# Patient Record
Sex: FEMALE | Race: WHITE
[De-identification: names, ages, dates, MRNs, and addresses within clinical notes are randomized per-mention and may not be internally consistent; named-entity substitution may affect disease eponyms.]

---

## 2018-12-19 ENCOUNTER — HOSPITAL ENCOUNTER (EMERGENCY)
Dept: HOSPITAL 74 - JER | Age: 43
Discharge: HOME | End: 2018-12-19
Payer: COMMERCIAL

## 2018-12-19 VITALS — HEART RATE: 120 BPM | SYSTOLIC BLOOD PRESSURE: 103 MMHG | DIASTOLIC BLOOD PRESSURE: 72 MMHG

## 2018-12-19 VITALS — TEMPERATURE: 97.3 F

## 2018-12-19 VITALS — BODY MASS INDEX: 29.1 KG/M2

## 2018-12-19 DIAGNOSIS — J06.9: Primary | ICD-10-CM

## 2018-12-19 LAB
ANION GAP SERPL CALC-SCNC: 6 MMOL/L (ref 8–16)
BASOPHILS # BLD: 0.4 % (ref 0–2)
BUN SERPL-MCNC: 8 MG/DL (ref 7–18)
CALCIUM SERPL-MCNC: 8.6 MG/DL (ref 8.5–10.1)
CHLORIDE SERPL-SCNC: 106 MMOL/L (ref 98–107)
CO2 SERPL-SCNC: 26 MMOL/L (ref 21–32)
CREAT SERPL-MCNC: 0.9 MG/DL (ref 0.55–1.3)
DEPRECATED RDW RBC AUTO: 12.8 % (ref 11.6–15.6)
EOSINOPHIL # BLD: 1 % (ref 0–4.5)
GLUCOSE SERPL-MCNC: 91 MG/DL (ref 74–106)
HCT VFR BLD CALC: 35.8 % (ref 32.4–45.2)
HGB BLD-MCNC: 12.8 GM/DL (ref 10.7–15.3)
LYMPHOCYTES # BLD: 12.7 % (ref 8–40)
MCH RBC QN AUTO: 33.5 PG (ref 25.7–33.7)
MCHC RBC AUTO-ENTMCNC: 35.8 G/DL (ref 32–36)
MCV RBC: 93.5 FL (ref 80–96)
MONOCYTES # BLD AUTO: 6.6 % (ref 3.8–10.2)
NEUTROPHILS # BLD: 79.3 % (ref 42.8–82.8)
PLATELET # BLD AUTO: 185 K/MM3 (ref 134–434)
PMV BLD: 8.7 FL (ref 7.5–11.1)
POTASSIUM SERPLBLD-SCNC: 3.4 MMOL/L (ref 3.5–5.1)
RBC # BLD AUTO: 3.83 M/MM3 (ref 3.6–5.2)
SODIUM SERPL-SCNC: 138 MMOL/L (ref 136–145)
WBC # BLD AUTO: 6.4 K/MM3 (ref 4–10)

## 2018-12-19 PROCEDURE — 3E0337Z INTRODUCTION OF ELECTROLYTIC AND WATER BALANCE SUBSTANCE INTO PERIPHERAL VEIN, PERCUTANEOUS APPROACH: ICD-10-PCS

## 2018-12-19 PROCEDURE — 3E0F7GC INTRODUCTION OF OTHER THERAPEUTIC SUBSTANCE INTO RESPIRATORY TRACT, VIA NATURAL OR ARTIFICIAL OPENING: ICD-10-PCS

## 2018-12-19 NOTE — PDOC
Attending Attestation





- HPI


HPI: 





12/19/18 20:21


The patient is a 43 year old female, with a significant past medical history of 

asthma (uses albuterol inhaler as needed), who presents to the emergency 

department with gradual onset of cough, sore throat, and chest pressure over 

the last few weeks. She reportedly had a cough productive of yellow sputum with 

associated sore throat at initial onset. She states she had a strep test at 

urgent care which was negative at the time. She states her cough has been 

persistent, however, states the cough is now non productive. She reports a 

couple of days of generalized chest pressure. She states she had a CX at urgent 

care which was negative for abnormalities today, however, presented to the 

office tachycardic and was advised to come to the ED.  She states her sons at 

home have been ill with viral URI. She denies any recent travels. She denies 

use of oral contraceptives. 





The patient denies shortness of breath, headache and dizziness. The patient 

denies fever, chills, nausea, vomit, diarrhea and constipation. The patient 

denies dysuria, frequency, urgency and hematuria. 





Allergies: penicillins


Past surgical history: none reported


Social history: Denies ETOH and tobacco. Denies illicit drug use. 








- Medical Decision Making





12/19/18 20:21





Documentation prepared by Taylor Prado, acting as medical scribe for Rocky Gorman MD





<Taylor Prado - Last Filed: 12/19/18 20:21>





- Resident


Resident Name: Alejandrina Steeel





- ED Attending Attestation


I have performed the following: I have examined & evaluated the patient, The 

case was reviewed & discussed with the resident, I agree w/resident's findings 

& plan, Exceptions are as noted





- Physicial Exam


PE: 





12/19/18 21:37





GENERAL: 


Well-appearing, well-nourished. No apparent distress.


HEENT: 


Normocephalic, atraumatic. PERRL, EOM intact.


CARDIOVASCULAR: 


Normal S1, S2. Regular rate and rhythm.


PULMONARY: 


Clear to auscultation bilaterally. normal wob


ABDOMEN: 


Soft, non-distended, non-tender. 


EXTREMITIES: 


Normal ROM in all four extremities. No gross deformities.


SKIN: 


Warm, dry.  No rash


NEUROLOGICAL: 


No focal neurological deficits











- Medical Decision Making





12/19/18 21:38


Likely infectious etiology, PE unlikely, pt is otherwise healthy, ekg shows ST, 

CXR sent via cd from outside facility PA/Lateral w/o signs of acute infection, 

consolidation





f/u labs, symptomatic tx, re-eval


dispo per clinical course





12/19/18 22:17


Symptomatically improved


DC home with pcp follow up


C/w outpatient plan








<Rocky Gorman - Last Filed: 12/19/18 22:17>

## 2018-12-19 NOTE — PDOC
Rapid Medical Evaluation


Medical Evaluation: 


I have performed a brief in-person evaluation of this patient.


The patient presents with a chief complaint of: Hx of asthma; Having cold-like 

symptoms since beginning of December. C/O chest pain with dry cough and sore 

throat. Went to [facility] [place] today where EKG and CXR were done (has CD of CXR). 

Patient was prescribed Azithromycin and Prednisone and advised to come to ED 

for evaluation





Pertinent physical exam findings: In NAD, lungs CTA B/L


I have ordered the following: Labs, EKG





The patient will proceed to the ED for further evaluation.


12/19/18 18:53

## 2018-12-19 NOTE — PDOC
History of Present Illness





- General


Chief Complaint: Chest Pain


Stated Complaint: CHEST PAIN/cough, sent from URGENT CARE


Time Seen by Provider: 12/19/18 19:11


History Source: Patient


Exam Limitations: No Limitations





- History of Present Illness


Initial Comments: 


Pt presenting with complaints of cough and chest pressure. The pt states the 

cough initially began about 3 weeks ago, and was productive of yellowish sputum 

at this time, associated with a sore throat. She went to an urgent care center, 

and strep test was negative at that time; she was diagnosed with viral URI. She 

also has 2 sons at home with similar symptoms. Pt has PMH of intermittent asthma

, but has not used her inhaler at home during this illness. Today she went to 

[facility] ([facility] due to continued chest pressure, SOB, and dry cough 

(cough no longer productive). They did a chest x-ray and ECG which showed only 

sinus tachycardia, but told her to come to the ER for further work-up. Pt 

denies any fevers/chills, headache, vision changes, chest pain, palpitations, 

nausea/vomiting, abdominal pain, urinary symptoms, diarrhea/constipation, or 

leg swelling.





Social: Pt denies any cigarette, alcohol, or drug use.


Pt denies any recent travel. Her 2 adolescent sons have had viral URI at home.


Surgical: no relevant history


Family: Father - MI and stroke age 50s, but was heavy smoker.


12/19/18 20:34





12/19/18 20:35








Past History





- Travel


Traveled outside of the country in the last 30 days: No


Close contact w/someone who was outside of country & ill: No





- Past Medical History


Allergies/Adverse Reactions: 


 Allergies











Allergy/AdvReac Type Severity Reaction Status Date / Time


 


Penicillins Allergy   Verified 12/19/18 18:57











Home Medications: 


Ambulatory Orders





Azithromycin [Zithromax Tri-Jc] 250 mg PO DAILY 5 Days #6 tablet 12/19/18 


Prednisone [Deltasone] 20 mg PO TID 5 Days #15 tablet 12/19/18 








Asthma: Yes


Cardiac Disorders: No


Hx Myocardial Infarction: No


COPD: No


Diabetes: No


HTN: No


Hypercholesterolemia: No


Thyroid Disease: No





- Surgical History


Cardiac Surgery: No


Lung Surgery: No





- Reproductive History


LMP comment: 3 weeks ago


LMP Normal: Yes


Is Patient Pregnant Now?: No





- Suicide/Smoking/Psychosocial Hx


Smoking History: Never smoked


Information on smoking cessation initiated: No


Hx Alcohol Use: No


Drug/Substance Use Hx: No





**Review of Systems





- Review of Systems


Able to Perform ROS?: Yes


Is the patient limited English proficient: No


Constitutional: Yes: Weight Stable.  No: Chills, Diaphoresis, Fever, Loss of 

Appetite, Night Sweats, Weakness


HEENTM: No: Recent change in vision, Nose Congestion, Throat Swelling, 

Difficulty Swallowing


Respiratory: Yes: Cough, Shortness of Breath, SOB with Exertion, Productive 

cough.  No: Orthopnea, SOB at Rest, Wheezing, Hemoptysis


Cardiac (ROS): Yes: Chest Tightness.  No: Chest Pain, Edema, Irregular Heart 

Rate, Lightheadedness, Palpitations, Syncope


ABD/GI: No: Constipated, Diarrhea, Nausea, Poor Fluid Intake, Vomiting, 

Abdominal cramping


: No: Dysuria, Frequency, Flank Pain, Pain, Urgency


Musculoskeletal: No: Back Pain, Joint Pain, Neck Pain


Integumentary: No: Rash


Neurological: No: Headache, Weakness, Unsteady Gait, Dizziness


Psychiatric: No: Sleep Pattern Change, Change in Appetite


Endocrine: No: Increased Urine, Change in Weight


Hematologic/Lymphatic: No: Anemia, Blood Clots, Easy Bleeding, Easy Bruising


All Other Systems: Reviewed and Negative





*Physical Exam





- Vital Signs


 Last Vital Signs











Temp Pulse Resp BP Pulse Ox


 


 97.3 F L  115 H  19   174/78 H  98 


 


 12/19/18 18:54  12/19/18 18:54  12/19/18 18:54  12/19/18 18:54  12/19/18 18:54














- Physical Exam


General Appearance: Yes: Nourished, Appropriately Dressed, Mild Distress (

-110s, pt mildly dyspneic. BP improving during exam. Can speak in full sentences

, saturating 100% on RA.)


HEENT: positive: EOMI, VANITA, Normal ENT Inspection, Normal Voice, Symmetrical, 

TMs Normal, Pharynx Normal, Hearing Grossly Normal.  negative: Scleral Icterus (

R), Scleral Icterus (L), Muffled/Hoarse voice, Pharyngeal Erythema, Tonsillar 

Exudate, Tonsillar Erythema, Nasal Congestion, Rhinorrhea, TM Bulging, TM Dull, 

TM Erythema


Neck: positive: Trachea midline, Normal Thyroid, Supple.  negative: Tender, 

Rigid, Lymphadenopathy (R), Lymphadenopathy (L), Rigidity


Respiratory/Chest: positive: Normal Breath Sounds, Decreased Breath Sounds (

mildly diminished breath sounds throughout), Wheezing (mild wheezes L posterior 

lower lung field).  negative: Chest Tender, Lungs Clear, Respiratory Distress, 

Accessory Muscle Use


Cardiovascular: positive: Regular Rhythm, S1, S2, Tachycardia.  negative: 

Regular Rate, Edema, JVD, Murmur


Vascular Pulses: Carotid (R): 4+, Carotid (L): 4+


Gastrointestinal/Abdominal: positive: Normal Bowel Sounds, Flat, Soft.  negative

: Tender, Organomegaly, Pulsatile Mass, Distended, Guarding, Rebound


Rectal Exam: positive: deferred


Lymphatic: negative: Adenopathy, Tenderness


Musculoskeletal: positive: Normal Inspection.  negative: CVA Tenderness


Extremity: positive: Normal Capillary Refill, Normal Inspection, Normal Range 

of Motion, Pelvis Stable.  negative: Tender, Pedal Edema, Swelling


Integumentary: positive: Normal Color, Dry, Warm.  negative: Jaundice, Clammy, 

Diaphoresis, Rash


Neurologic: positive: CNs II-XII NML intact, Fully Oriented, Alert, Normal Mood/

Affect, Normal Response, Motor Strength 5/5





Moderate Sedation





- Procedure Monitoring


Vital Signs: 


Procedure Monitoring Vital Signs











Temperature  97.3 F L  12/19/18 18:54


 


Pulse Rate  115 H  12/19/18 18:54


 


Respiratory Rate  19   12/19/18 18:54


 


Blood Pressure  174/78 H  12/19/18 18:54


 


O2 Sat by Pulse Oximetry (%)  98   12/19/18 18:54











ED Treatment Course





- LABORATORY


CBC & Chemistry Diagram: 


 12/19/18 19:15





 12/19/18 19:15





- ADDITIONAL ORDERS


Additional order review: 


 Laboratory  Results











  12/19/18





  19:15


 


Sodium  138


 


Potassium  3.4 L


 


Chloride  106


 


Carbon Dioxide  26


 


Anion Gap  6 L


 


BUN  8


 


Creatinine  0.9


 


Creat Clearance w eGFR  > 60


 


Random Glucose  91


 


Calcium  8.6


 


Creatine Kinase  96


 


Troponin I  < 0.02








 











  12/19/18





  19:15


 


RBC  3.83


 


MCV  93.5


 


MCHC  35.8


 


RDW  12.8


 


MPV  8.7


 


Neutrophils %  79.3


 


Lymphocytes %  12.7


 


Monocytes %  6.6


 


Eosinophils %  1.0


 


Basophils %  0.4














- Medications


Given in the ED: 


ED Medications














Discontinued Medications














Generic Name Dose Route Start Last Admin





  Trade Name Freq  PRN Reason Stop Dose Admin


 


Acetaminophen  650 mg  12/19/18 19:36  12/19/18 19:47





  Tylenol -  PO  12/19/18 19:37  650 mg





  ONCE ONE   Administration





     





     





     





     


 


Albuterol Sulfate  1 amp  12/19/18 19:36  12/19/18 19:48





  Ventolin 0.083% Nebulizer Soln -  NEB  12/19/18 19:37  1 amp





  ONCE ONE   Administration





     





     





     





     














Medical Decision Making





- Medical Decision Making


Pt was seen at bedside, also will be seen by attending Dr. Gorman. Pt presenting 

with complaints of cough and chest pressure. The pt states the cough initially 

began about 3 weeks ago, and was productive of yellowish sputum at this time, 

associated with a sore throat. She went to an urgent care center, and strep 

test was negative at that time; she was diagnosed with viral URI. She also has 

2 sons at home with similar symptoms. Pt has PMH of intermittent asthma, but 

has not used her inhaler at home during this illness. Today she went to [facility] ([facility] due to continued chest pressure and SOB. They did a chest x

-ray and ECG which showed only sinus tachycardia, but told her to come to the 

ER for further work-up. Pt denies any fevers/chills, headache, vision changes, 

chest pain, palpitations, nausea/vomiting, abdominal pain, urinary symptoms, 

diarrhea/constipation, or leg swelling.





Tachycardic and hypertensive on presentation. PE showed improved vitals (HR low 

100s, BP improved to 134/78), mild wheezing over L posterior lower lung field, 

with diminished air movement. Exam otherwise benign, no LE edema, no abdominal 

tenderness/rebound/guarding. Oral pharynx clear.


Considering viral URI vs pneumonia with likely overlying asthma exacerbation. 

Minimal concern for PE at this time, as pt has productive cough and sick 

contacts. No estrogen use/recent travel/prolonged bedrest, no history of clots.


Ordered work-up including CBC, CMP, cardiac profile, ECG. 


Provided albuterol nebulizer and 650 mg PO tylenol for improvement of chest 

discomfort and wheezing..


Will continue to reassess pt and monitor for symptomatic improvement.





Chest x-ray from [facility] read in radiology and uploaded. No evidence of focal 

infiltrates noted.


12/19/18 20:15





Pt states breathing has improved and feels that cough is becoming more 

productive after albuterol nebulizer. Continues to have mild wheezes over L 

posterior lower lung field.


Providing 1 duoneb for continued improvement of air movement/wheezing.


BP normalized at 100s/80s. HR continues in low 100s, but we are now providing 

albuterol. O2 saturation continues at 100% on RA.


12/19/18 20:24





[facility] already sent azithromycin Z-pack and 20 mg prednisone steroid burst to 

pt pharmacy. Advised pt to continue using her albuterol inhaler at home with 

chest pressure and SOB. 


Appointment made at [facility] with Dr. Yang for this Friday.


Pt states feeling much better after nebulizer treatments. HR continues in 110s-

120, likely pt coughing and after nebulizer treatment. Auscultation showed 

better air movement and no wheezing. 


Pt pharmacy closed so sent steroids and z-pack to pt pharmacy.


Considering normal lab results and imaging, pt can be discharged to home with 

follow-up. Strict return precautions provided with pt understanding.


12/19/18 21:58








*DC/Admit/Observation/Transfer


Diagnosis at time of Disposition: 


Upper respiratory infection


Qualifiers:


 URI type: unspecified URI Qualified Code(s): J06.9 - Acute upper respiratory 

infection, unspecified








- Discharge Dispostion


Disposition: HOME


Condition at time of disposition: Improved


Decision to Admit order: No





- Prescriptions


Prescriptions: 


Azithromycin [Zithromax Tri-Jc] 250 mg PO DAILY 5 Days #6 tablet


Prednisone [Deltasone] 20 mg PO TID 5 Days #15 tablet





- Referrals





- Patient Instructions


Printed Discharge Instructions:  DI for Viral Upper Respiratory Infection -- 

Adult


Additional Instructions: 


You were seen in the ER today for chest pressure and cough. The results of your 

labs and imaging today were normal. Please follow-up with the primary care 

appointment made for you on Friday (Dr. Yang) to discuss your visit and make 

sure your symptoms have improved. Please return to the ER if you have any 

worsening chest pressure or pain, inability to catch your breath, worsening 

wheezing, development of fevers or chills, loss of consciousness, inability to 

tolerate food or fluids, or any other concerns.





Please take the medications prescribed to you by the urgent care center. Please 

also take your albuterol inhaler (1-2 puffs every 4-6 hours) as needed for 

wheezing and chest pressure.








- Post Discharge Activity

## 2018-12-20 NOTE — EKG
Test Reason : 

Blood Pressure : ***/*** mmHG

Vent. Rate : 114 BPM     Atrial Rate : 114 BPM

   P-R Int : 130 ms          QRS Dur : 078 ms

    QT Int : 320 ms       P-R-T Axes : 050 024 000 degrees

   QTc Int : 441 ms

 

SINUS TACHYCARDIA

NONSPECIFIC ST AND T WAVE ABNORMALITY

ABNORMAL ECG

NO PREVIOUS ECGS AVAILABLE

Confirmed by VIKKI RIVERA MD ([id number]) on 12/20/2018 12:02:38 PM

 

Referred By:             Confirmed By:VIKKI RIVERA MD